# Patient Record
Sex: MALE | Race: BLACK OR AFRICAN AMERICAN | Employment: OTHER | URBAN - METROPOLITAN AREA
[De-identification: names, ages, dates, MRNs, and addresses within clinical notes are randomized per-mention and may not be internally consistent; named-entity substitution may affect disease eponyms.]

---

## 2020-12-18 ENCOUNTER — OFFICE VISIT (OUTPATIENT)
Dept: FAMILY MEDICINE CLINIC | Age: 40
End: 2020-12-18
Payer: COMMERCIAL

## 2020-12-18 VITALS
SYSTOLIC BLOOD PRESSURE: 160 MMHG | OXYGEN SATURATION: 97 % | TEMPERATURE: 96.7 F | DIASTOLIC BLOOD PRESSURE: 90 MMHG | RESPIRATION RATE: 18 BRPM | HEART RATE: 107 BPM | HEIGHT: 76 IN | WEIGHT: 315 LBS | BODY MASS INDEX: 38.36 KG/M2

## 2020-12-18 PROCEDURE — 99213 OFFICE O/P EST LOW 20 MIN: CPT | Performed by: NURSE PRACTITIONER

## 2020-12-18 PROCEDURE — G8484 FLU IMMUNIZE NO ADMIN: HCPCS | Performed by: NURSE PRACTITIONER

## 2020-12-18 PROCEDURE — G8427 DOCREV CUR MEDS BY ELIG CLIN: HCPCS | Performed by: NURSE PRACTITIONER

## 2020-12-18 PROCEDURE — 4004F PT TOBACCO SCREEN RCVD TLK: CPT | Performed by: NURSE PRACTITIONER

## 2020-12-18 PROCEDURE — G8417 CALC BMI ABV UP PARAM F/U: HCPCS | Performed by: NURSE PRACTITIONER

## 2020-12-18 RX ORDER — NAPROXEN 500 MG/1
TABLET ORAL
Qty: 20 TABLET | Refills: 0 | Status: SHIPPED
Start: 2020-12-18 | End: 2021-08-25

## 2020-12-18 RX ORDER — CLINDAMYCIN HYDROCHLORIDE 300 MG/1
300 CAPSULE ORAL 4 TIMES DAILY
Qty: 28 CAPSULE | Refills: 0 | Status: SHIPPED | OUTPATIENT
Start: 2020-12-18 | End: 2020-12-25

## 2020-12-18 NOTE — PROGRESS NOTES
(All laboratory and radiology results have been personally reviewed by myself)  Labs:  No results found for this visit on 12/18/20. Imaging: All Radiology results interpreted by Radiologist unless otherwise noted. No orders to display       Assessment / Plan:   The patient's vitals, allergies, medications, and past medical history have been reviewed. Kiran Nolan was seen today for dental pain. Diagnoses and all orders for this visit:    Infected dental carries  -     clindamycin (CLEOCIN) 300 MG capsule; Take 1 capsule by mouth 4 times daily for 7 days  -     naproxen (NAPROSYN) 500 MG tablet; 1 tablet, 2 times a day as needed for pain        - Disposition: Discharge to home. - Scripts written for Cleocin and Naproxen, side effects discussed. Increase fluids and rest. Pt advised to schedule a f/u appt with either a private dentist or the [facility] dental clinic within 1 week (contact information provided). ED sooner if symptoms worsen or change. ED immediately with severe/worsening pain, dyspnea, dysphagia, trismus, drooling, fever, or facial edema. Pt states understanding and is in agreement with this care plan. All questions answered.       SIGNATURE: Vee Rosales, PERLITA-CNP

## 2021-01-24 ENCOUNTER — HOSPITAL ENCOUNTER (EMERGENCY)
Age: 41
Discharge: HOME | End: 2021-01-24
Attending: EMERGENCY MEDICINE
Payer: COMMERCIAL

## 2021-01-24 VITALS
HEART RATE: 92 BPM | SYSTOLIC BLOOD PRESSURE: 163 MMHG | TEMPERATURE: 97.3 F | BODY MASS INDEX: 38.36 KG/M2 | OXYGEN SATURATION: 95 % | RESPIRATION RATE: 18 BRPM | HEIGHT: 76 IN | DIASTOLIC BLOOD PRESSURE: 88 MMHG | WEIGHT: 315 LBS

## 2021-01-24 DIAGNOSIS — H18.822 CORNEAL ABRASION DUE TO CONTACT LENS, LEFT: Primary | ICD-10-CM

## 2021-01-24 PROCEDURE — 99283 EMERGENCY DEPT VISIT LOW MDM: CPT

## 2021-01-24 PROCEDURE — 6370000000 HC RX 637 (ALT 250 FOR IP)

## 2021-01-24 RX ORDER — ERYTHROMYCIN 5 MG/G
OINTMENT OPHTHALMIC
Qty: 1 TUBE | Refills: 0 | Status: SHIPPED | OUTPATIENT
Start: 2021-01-24 | End: 2021-02-03

## 2021-01-24 RX ORDER — TETRACAINE HYDROCHLORIDE 5 MG/ML
SOLUTION OPHTHALMIC
Status: COMPLETED
Start: 2021-01-24 | End: 2021-01-24

## 2021-01-24 RX ORDER — OXYCODONE HYDROCHLORIDE AND ACETAMINOPHEN 5; 325 MG/1; MG/1
1 TABLET ORAL EVERY 6 HOURS PRN
Qty: 6 TABLET | Refills: 0 | Status: SHIPPED | OUTPATIENT
Start: 2021-01-24 | End: 2021-01-26

## 2021-01-24 RX ORDER — TOBRAMYCIN 3 MG/ML
1 SOLUTION/ DROPS OPHTHALMIC 4 TIMES DAILY
Qty: 1 BOTTLE | Refills: 1 | Status: SHIPPED | OUTPATIENT
Start: 2021-01-24 | End: 2021-02-03

## 2021-01-24 RX ADMIN — FLUORESCEIN SODIUM 1 STRIP: 0.6 STRIP OPHTHALMIC at 10:26

## 2021-01-24 RX ADMIN — TETRACAINE HYDROCHLORIDE: 5 SOLUTION OPHTHALMIC at 10:26

## 2021-01-24 ASSESSMENT — PAIN DESCRIPTION - FREQUENCY: FREQUENCY: CONTINUOUS

## 2021-01-24 ASSESSMENT — PAIN DESCRIPTION - DESCRIPTORS: DESCRIPTORS: SORE

## 2021-01-24 ASSESSMENT — PAIN DESCRIPTION - ORIENTATION: ORIENTATION: LEFT

## 2021-01-24 ASSESSMENT — PAIN DESCRIPTION - LOCATION: LOCATION: EYE

## 2021-01-24 NOTE — ED PROVIDER NOTES
EXAM--------------------------------------      Constitutional/General: Alert and oriented x3, well appearing, non toxic in NAD  Head: NC/AT  Eyes: PERRL, EOMI; left eye is injected 2+. There is no hyphema. There is no foreign body under either the upper or lower lid. Extraocular muscles are intact. Pupils are equal and reactive. Fluorescein staining reveals a dense corneal abrasion just to the left of the center of the left cornea with some more diffuse fluorescein uptake around the small dense abrasion. There is no ulceration at this time. There is no hyphema. Mouth: Oropharynx clear, handling secretions, no trismus  Neck: Supple, full ROM, no meningeal signs  Pulmonary: Lungs clear to auscultation bilaterally, no wheezes, rales, or rhonchi. Not in respiratory distress  Cardiovascular:  Regular rate and rhythm, no murmurs, gallops, or rubs. 2+ distal pulses  Abdomen: Soft, non tender, non distended,   Extremities: Moves all extremities x 4. Warm and well perfused  Skin: warm and dry without rash  Neurologic: GCS 15,  Psych: Normal Affect      ------------------------------ ED COURSE/MEDICAL DECISION MAKING----------------------  Medications   tetracaine (TETRAVISC) 0.5 % ophthalmic solution (has no administration in time range)   fluorescein 0.6 MG ophthalmic strip (has no administration in time range)         Medical Decision Making:   exam    Counseling: The emergency provider has spoken with the patient and discussed todays results, in addition to providing specific details for the plan of care and counseling regarding the diagnosis and prognosis. Questions are answered at this time and they are agreeable with the plan.      --------------------------------- IMPRESSION AND DISPOSITION ---------------------------------    IMPRESSION  1.  Corneal abrasion due to contact lens, left        DISPOSITION  Disposition: Discharge to home  Patient condition is fair                  Darin Paniagua, MD  01/27/21 7229

## 2021-08-25 ENCOUNTER — OFFICE VISIT (OUTPATIENT)
Dept: ENDOCRINOLOGY | Age: 41
End: 2021-08-25
Payer: COMMERCIAL

## 2021-08-25 VITALS
RESPIRATION RATE: 18 BRPM | SYSTOLIC BLOOD PRESSURE: 131 MMHG | BODY MASS INDEX: 38.36 KG/M2 | HEART RATE: 86 BPM | DIASTOLIC BLOOD PRESSURE: 81 MMHG | WEIGHT: 315 LBS | HEIGHT: 76 IN

## 2021-08-25 DIAGNOSIS — E11.9 TYPE 2 DIABETES MELLITUS WITHOUT COMPLICATION, WITH LONG-TERM CURRENT USE OF INSULIN (HCC): Primary | ICD-10-CM

## 2021-08-25 DIAGNOSIS — Z91.119 DIETARY NONCOMPLIANCE: ICD-10-CM

## 2021-08-25 DIAGNOSIS — Z79.4 TYPE 2 DIABETES MELLITUS WITHOUT COMPLICATION, WITH LONG-TERM CURRENT USE OF INSULIN (HCC): Primary | ICD-10-CM

## 2021-08-25 DIAGNOSIS — E55.9 VITAMIN D DEFICIENCY: ICD-10-CM

## 2021-08-25 DIAGNOSIS — E78.5 HYPERLIPIDEMIA, UNSPECIFIED HYPERLIPIDEMIA TYPE: ICD-10-CM

## 2021-08-25 DIAGNOSIS — E66.01 MORBID OBESITY (HCC): ICD-10-CM

## 2021-08-25 LAB — HBA1C MFR BLD: 7.2 %

## 2021-08-25 PROCEDURE — G8427 DOCREV CUR MEDS BY ELIG CLIN: HCPCS | Performed by: INTERNAL MEDICINE

## 2021-08-25 PROCEDURE — 83036 HEMOGLOBIN GLYCOSYLATED A1C: CPT | Performed by: INTERNAL MEDICINE

## 2021-08-25 PROCEDURE — G8417 CALC BMI ABV UP PARAM F/U: HCPCS | Performed by: INTERNAL MEDICINE

## 2021-08-25 PROCEDURE — 2022F DILAT RTA XM EVC RTNOPTHY: CPT | Performed by: INTERNAL MEDICINE

## 2021-08-25 PROCEDURE — 4004F PT TOBACCO SCREEN RCVD TLK: CPT | Performed by: INTERNAL MEDICINE

## 2021-08-25 PROCEDURE — 99204 OFFICE O/P NEW MOD 45 MIN: CPT | Performed by: INTERNAL MEDICINE

## 2021-08-25 PROCEDURE — 3051F HG A1C>EQUAL 7.0%<8.0%: CPT | Performed by: INTERNAL MEDICINE

## 2021-08-25 RX ORDER — AMLODIPINE BESYLATE AND BENAZEPRIL HYDROCHLORIDE 10; 40 MG/1; MG/1
CAPSULE ORAL
COMMUNITY
Start: 2021-07-10

## 2021-08-25 RX ORDER — ROSUVASTATIN CALCIUM 40 MG/1
TABLET, COATED ORAL
COMMUNITY
Start: 2021-07-10 | End: 2022-03-23 | Stop reason: SDUPTHER

## 2021-08-25 RX ORDER — DULAGLUTIDE 1.5 MG/.5ML
INJECTION, SOLUTION SUBCUTANEOUS
COMMUNITY
Start: 2021-08-08 | End: 2022-03-23

## 2021-08-25 RX ORDER — ASPIRIN 81 MG/1
81 TABLET ORAL DAILY
COMMUNITY

## 2021-08-25 RX ORDER — LANCETS 33 GAUGE
EACH MISCELLANEOUS
Qty: 200 EACH | Refills: 6 | Status: SHIPPED
Start: 2021-08-25 | End: 2022-03-23 | Stop reason: SDUPTHER

## 2021-08-25 RX ORDER — BLOOD SUGAR DIAGNOSTIC
STRIP MISCELLANEOUS
Qty: 200 EACH | Refills: 6 | Status: SHIPPED
Start: 2021-08-25 | End: 2022-03-23 | Stop reason: SDUPTHER

## 2021-08-25 RX ORDER — NIACIN 500 MG/1
TABLET, EXTENDED RELEASE ORAL
COMMUNITY
Start: 2021-08-14 | End: 2022-03-23 | Stop reason: SDUPTHER

## 2021-08-25 NOTE — PATIENT INSTRUCTIONS
Recommendations for today's visit  ·   · Check Blood sugar 4 times/day before meals and at bedtime and send us sugar log in a week      These are your blood sugar, blood pressure, cholesterol and A1c goals:  · Blood sugar fastin mg/dl to 130 mg/dl  · Blood sugar before meals: <150 mg/dl  · Peak blood sugar lower than 180 mg/dl  · A1c: between 6.5 - 7.5%    I you have any questions please call Dr. Ron No office     Lucinda Lantigua MD  Endocrinologist, [facility])   [address] [place] [address] [id number]   Phone: [phone]  Fax: [phone]  Email: [email or web address]

## 2021-08-25 NOTE — PROGRESS NOTES
[address] [place] [address] Department of Endocrinology Diabetes and Metabolism   [address] [facility] [address]   Phone: [phone]  Fax: [phone]    Date of Service: 8/25/2021  Primary Care Physician: Soham Maki DO  Referring physician: Billie Frederick MD  Provider: Chema Hoskins MD     Reason for the visit:  DM type 2     History of Present Illness: The history is provided by the patient. No  was used. Accuracy of the patient data is excellent. Yamila Cruz is a very pleasant 42 y.o. male seen today for diabetes management     Yamila Cruz was diagnosed with diabetes at age 35 and currently on invokamet 150/500 mg 1 tab BID, Trulicity 1.5 mg weekly   The patient wasn't checking blood sugar at all   Most recent A1c results summarized below  Lab Results   Component Value Date    LABA1C 7.2 08/25/2021       Patient has had no hypoglycemic episodes   The patient has been mindful of what has been eating and following diabetic diet as encouraged  I reviewed current medications and the patient has no issues with diabetes medications  Yamila Cruz is up to date with eye exam and denied any history of diabetic retinopathy   The patient performs his own feet care  Microvascular complications:  No Retinopathy, Nephropathy or Neuropathy   Macrovascular complications: no CAD, PVD, or Stroke  The patient receives Flushot every year     PAST MEDICAL HISTORY   Past Medical History:   Diagnosis Date    Diabetes mellitus ([address]     Hyperlipidemia     Hypertension        PAST SURGICAL HISTORY   Past Surgical History:   Procedure Laterality Date    ANKLE SURGERY  1996    left ankle. SOCIAL HISTORY   Tobacco:   reports that he has been smoking cigars. He has been smoking about 2.00 packs per day. He has never used smokeless tobacco.  Alcohol:   reports current alcohol use. Drugs:   reports no history of drug use.     FAMILY HISTORY   No family history on file.    ALLERGIES AND DRUG REACTIONS   No Known Allergies    CURRENT MEDICATIONS   Current Outpatient Medications   Medication Sig Dispense Refill    TRULICITY 1.5 ZG/6.2RI SOPN inject 0.5 milliliters ( 1 AND 1/2 milligrams ) subcutaneously ev. ..  (REFER TO PRESCRIPTION NOTES).  amLODIPine-benazepril (LOTREL) 10-40 MG per capsule take 1 capsule by mouth once daily      niacin (NIASPAN) 500 MG extended release tablet take 1 tablet by mouth once daily      rosuvastatin (CRESTOR) 40 MG tablet take 1 tablet by mouth once daily      aspirin 81 MG EC tablet Take 81 mg by mouth daily      blood glucose test strips (ONETOUCH VERIO) strip Check blood sugar 4 times a day before meals and at bedtime. Also check as needed for feelings of hypoglycemia. 200 each 6    Lancets (ONETOUCH DELICA PLUS [id number]) MISC Check blood sugars 4 times daily before meals and at bedtime. Check as needed for feelings of hypoglycemia. 200 each 6    Metoprolol Succinate (TOPROL XL PO) Take by mouth      canagliflozin-metformin HCl (INVOKAMET) 150-500 MG Take 1 tablet by mouth 2 times daily (with meals)      Fenofibrate (TRIGLIDE PO) Take  by mouth. No current facility-administered medications for this visit. Review of Systems  Constitutional: No fever, no chills, no diaphoresis, no generalized weakness. HEENT: No blurred vision, No sore throat, no ear pain, no hair loss  Neck: denied any neck swelling, difficulty swallowing,   Cardio-pulmonary: No CP, SOB or palpitation, No orthopnea or PND. No cough or wheezing. GI: No N/V/D, no constipation, No abdominal pain, no melena or hematochezia   : Denied any dysuria, hematuria, flank pain, discharge, or incontinence. Skin: denied any rash, ulcer, Hirsute, or hyperpigmentation. MSK: denied any joint deformity, joint pain/swelling, muscle pain, or back pain.   Neuro: no numbness, no tingling, no weakness, _    OBJECTIVE    /81   Pulse 86   Resp 18   Ht 6' 4\" (1.93 m)   Wt (!) 365 lb (165.6 kg)   BMI 44.43 kg/m²   BP Readings from Last 4 Encounters:   08/25/21 131/81   01/24/21 (!) 163/88   12/18/20 (!) 160/90   07/12/17 132/80     Wt Readings from Last 6 Encounters:   08/25/21 (!) 365 lb (165.6 kg)   01/24/21 (!) 376 lb (170.6 kg)   12/18/20 (!) 376 lb (170.6 kg)   07/12/17 (!) 378 lb (171.5 kg)       Physical examination:  General: awake alert, oriented x3, no abnormal position or movements. HEENT: normocephalic non-traumatic, no exophthalmos   Neck: supple, no LN enlargement, no thyromegaly, no thyroid tenderness, no JVD. Pulm: Clear equal air entry no added sounds, no wheezing or rhonchi    CVS: S1 + S2, no murmur, no heave. Dorsalis pedis pulse palpable   Abd: soft lax, no tenderness, no organomegaly, audible bowel sounds. Skin: warm, no lesions, no rash. No callus, no Ulcers, No acanthosis nigricans  Musculoskeletal: No back tenderness, no kyphosis/scoliosis    Neuro: CN intact, Monofilament sensation decreased bilateral , muscle power normal  Psych: normal mood, and affect    Review of Laboratory Data:  I personally reviewed the following lab:  No results found for: WBC, RBC, HGB, HCT, MCV, MCH, MCHC, RDW, PLT, MPV, GRANULOCYTES, BANDS   No results found for: NA, K, CO2, BUN, CREATININE, CALCIUM, LABGLOM, GFRAA   No results found for: TSH, T4FREE, K9FLMIE, FT3, J5CSCLZ, TSI, TPOABS, THGAB  Lab Results   Component Value Date    LABA1C 7.2 08/25/2021     Lab Results   Component Value Date    LABA1C 7.2 08/25/2021     No results found for: TRIG, HDL, LDLCALC, CHOL  No results found for: [address], a 42 y.o.-old male seen in for the following issues     Diabetes Mellitus Type 2     · A1c 7.2%   · Continue invokamet 150/500 mg 1 tab BID, Trulicity 1.5 mg weekly   · The patient was advised to check blood sugars 2 times a day before meals and send BS readings to our office in a week.   · Discussed with patient A1c and blood sugar goals   · Patient will need routine diabetes maintenance and prevention  · The patient was referred to diabetic educator for further teaching   · Diabetes labs before next visit     Dietary noncompliance   Discussed with patient the importance of eating consistent carbohydrate meals, avoiding high glycemic index food. Also, discussed with patient the risk and negative consequences of dietary noncompliance on blood glucose control, blood pressure and weight    Obesity   Discussed lifestyle changes including diet and exercise with patient in depth. Also discussed with patient cardiovascular risk associated with obesity   On GLP-1 agonist     HLD  · Continue Crestor 40 mg daily     I personally reviewed external notes from PCP and other patient's care team providers, and personally interpreted labs associated with the above diagnosis. I also ordered labs to further assess and manage the above addressed medical conditions. Return in about 4 months (around 12/25/2021) for DM type 2, VitD deficiency. The above issues were reviewed with the patient who understood and agreed with the plan. Thank you for allowing us to participate in the care of this patient. Please do not hesitate to contact us with any additional questions. Diagnosis Orders   1. Type 2 diabetes mellitus without complication, with long-term current use of insulin ([facility])  POCT glycosylated hemoglobin (Hb A1C)    Lipid Panel    Microalbumin / Creatinine Urine Ratio    Comprehensive Metabolic Panel    blood glucose test strips (ONETOUCH VERIO) strip    Lancets (ONETOUCH DELICA PLUS [id number]) MISC   2. Vitamin D deficiency  Vitamin D 25 Hydroxy   3. Hyperlipidemia, unspecified hyperlipidemia type     4. Dietary noncompliance     5.  Morbid obesity ([facility]       Bart Hurtado MD  Endocrinologist, [facility] Diabetes Care and Endocrinology   [address] [facility] [address]   Phone: [phone]  Fax: [phone]  --------------------------------------------  An electronic signature was used to authenticate this note.  Talisha Hodgson MD on 8/25/2021 at 6:51 PM

## 2022-03-23 ENCOUNTER — OFFICE VISIT (OUTPATIENT)
Dept: ENDOCRINOLOGY | Age: 42
End: 2022-03-23
Payer: COMMERCIAL

## 2022-03-23 VITALS
HEART RATE: 87 BPM | HEIGHT: 76 IN | WEIGHT: 315 LBS | BODY MASS INDEX: 38.36 KG/M2 | DIASTOLIC BLOOD PRESSURE: 84 MMHG | SYSTOLIC BLOOD PRESSURE: 138 MMHG

## 2022-03-23 DIAGNOSIS — E78.5 HYPERLIPIDEMIA, UNSPECIFIED HYPERLIPIDEMIA TYPE: ICD-10-CM

## 2022-03-23 DIAGNOSIS — E11.9 TYPE 2 DIABETES MELLITUS WITHOUT COMPLICATION, WITH LONG-TERM CURRENT USE OF INSULIN (HCC): Primary | ICD-10-CM

## 2022-03-23 DIAGNOSIS — Z79.4 TYPE 2 DIABETES MELLITUS WITHOUT COMPLICATION, WITH LONG-TERM CURRENT USE OF INSULIN (HCC): ICD-10-CM

## 2022-03-23 DIAGNOSIS — Z79.4 TYPE 2 DIABETES MELLITUS WITHOUT COMPLICATION, WITH LONG-TERM CURRENT USE OF INSULIN (HCC): Primary | ICD-10-CM

## 2022-03-23 DIAGNOSIS — E11.9 TYPE 2 DIABETES MELLITUS WITHOUT COMPLICATION, WITH LONG-TERM CURRENT USE OF INSULIN (HCC): ICD-10-CM

## 2022-03-23 LAB
ANION GAP SERPL CALCULATED.3IONS-SCNC: 14 MMOL/L (ref 7–16)
BUN BLDV-MCNC: 9 MG/DL (ref 6–20)
CALCIUM SERPL-MCNC: 9.6 MG/DL (ref 8.6–10.2)
CHLORIDE BLD-SCNC: 101 MMOL/L (ref 98–107)
CHOLESTEROL, TOTAL: 143 MG/DL (ref 0–199)
CO2: 24 MMOL/L (ref 22–29)
CREAT SERPL-MCNC: 0.8 MG/DL (ref 0.7–1.2)
GFR AFRICAN AMERICAN: >60
GFR NON-AFRICAN AMERICAN: >60 ML/MIN/1.73
GLUCOSE BLD-MCNC: 194 MG/DL (ref 74–99)
HBA1C MFR BLD: 7.8 %
HDLC SERPL-MCNC: 56 MG/DL
LDL CHOLESTEROL CALCULATED: 34 MG/DL (ref 0–99)
POTASSIUM SERPL-SCNC: 4.8 MMOL/L (ref 3.5–5)
SODIUM BLD-SCNC: 139 MMOL/L (ref 132–146)
TRIGL SERPL-MCNC: 264 MG/DL (ref 0–149)
VLDLC SERPL CALC-MCNC: 53 MG/DL

## 2022-03-23 PROCEDURE — 2022F DILAT RTA XM EVC RTNOPTHY: CPT | Performed by: INTERNAL MEDICINE

## 2022-03-23 PROCEDURE — G8417 CALC BMI ABV UP PARAM F/U: HCPCS | Performed by: INTERNAL MEDICINE

## 2022-03-23 PROCEDURE — 3051F HG A1C>EQUAL 7.0%<8.0%: CPT | Performed by: INTERNAL MEDICINE

## 2022-03-23 PROCEDURE — 83036 HEMOGLOBIN GLYCOSYLATED A1C: CPT | Performed by: INTERNAL MEDICINE

## 2022-03-23 PROCEDURE — G8484 FLU IMMUNIZE NO ADMIN: HCPCS | Performed by: INTERNAL MEDICINE

## 2022-03-23 PROCEDURE — G8428 CUR MEDS NOT DOCUMENT: HCPCS | Performed by: INTERNAL MEDICINE

## 2022-03-23 PROCEDURE — 99214 OFFICE O/P EST MOD 30 MIN: CPT | Performed by: INTERNAL MEDICINE

## 2022-03-23 PROCEDURE — 4004F PT TOBACCO SCREEN RCVD TLK: CPT | Performed by: INTERNAL MEDICINE

## 2022-03-23 RX ORDER — NIACIN 500 MG/1
TABLET, EXTENDED RELEASE ORAL
Qty: 90 TABLET | Refills: 2 | Status: SHIPPED | OUTPATIENT
Start: 2022-03-23

## 2022-03-23 RX ORDER — ROSUVASTATIN CALCIUM 40 MG/1
TABLET, COATED ORAL
Qty: 90 TABLET | Refills: 2 | Status: SHIPPED | OUTPATIENT
Start: 2022-03-23

## 2022-03-23 RX ORDER — DULAGLUTIDE 3 MG/.5ML
3 INJECTION, SOLUTION SUBCUTANEOUS WEEKLY
Qty: 12 PEN | Refills: 2 | Status: SHIPPED | OUTPATIENT
Start: 2022-03-23

## 2022-03-23 RX ORDER — CANAGLIFLOZIN AND METFORMIN HYDROCHLORIDE 150; 500 MG/1; MG/1
1 TABLET, FILM COATED ORAL 2 TIMES DAILY WITH MEALS
Qty: 180 TABLET | Refills: 2 | Status: SHIPPED | OUTPATIENT
Start: 2022-03-23

## 2022-03-23 RX ORDER — LANCETS 33 GAUGE
EACH MISCELLANEOUS
Qty: 100 EACH | Refills: 6 | Status: SHIPPED | OUTPATIENT
Start: 2022-03-23

## 2022-03-23 RX ORDER — BLOOD SUGAR DIAGNOSTIC
STRIP MISCELLANEOUS
Qty: 100 EACH | Refills: 6 | Status: SHIPPED | OUTPATIENT
Start: 2022-03-23

## 2022-03-23 NOTE — PROGRESS NOTES
[address] [place] [address] Department of Endocrinology Diabetes and Metabolism   [address] [facility] [address]   Phone: [phone]  Fax: [phone]    Date of Service: 3/23/2022  Primary Care Physician: [address]  Referring physician: No ref. provider found  Provider: Sosa Cabezas MD     Reason for the visit:  DM type 2     History of Present Illness: The history is provided by the patient. No  was used. Accuracy of the patient data is excellent. Paradise Banks is a very pleasant 42 y.o. male seen today for diabetes management     Paradise Banks was diagnosed with diabetes at age 35 and currently on invokamet 150/500 mg 1 tab BID, Trulicity 3mg weekly   The pateint ran out of test strips 3 weeks ago and wasn't wasn't checking BG   Most recent A1c results summarized below  Lab Results   Component Value Date    LABA1C 7.8 03/23/2022    LABA1C 7.2 08/25/2021       Patient has had no hypoglycemic episodes   The patient has been mindful of what has been eating and following diabetic diet as encouraged  I reviewed current medications and the patient has no issues with diabetes medications  Paradise Banks is up to date with eye exam and denied any history of diabetic retinopathy   The patient performs his own feet care  Microvascular complications:  No Retinopathy, Nephropathy or Neuropathy   Macrovascular complications: no CAD, PVD, or Stroke  The patient receives Flushot every year     PAST MEDICAL HISTORY   Past Medical History:   Diagnosis Date    Diabetes mellitus ([address]     Hyperlipidemia     Hypertension        PAST SURGICAL HISTORY   Past Surgical History:   Procedure Laterality Date    ANKLE SURGERY  1996    left ankle. SOCIAL HISTORY   Tobacco:   reports that he has quit smoking. His smoking use included cigars. He smoked 2.00 packs per day. He uses smokeless tobacco.  Alcohol:   reports current alcohol use.   Drugs:   reports no history of drug use. FAMILY HISTORY   No family history on file. ALLERGIES AND DRUG REACTIONS   No Known Allergies    CURRENT MEDICATIONS   Current Outpatient Medications   Medication Sig Dispense Refill    TRULICITY 1.5 QV/1.1XF SOPN inject 0.5 milliliters ( 1 AND 1/2 milligrams ) subcutaneously ev. ..  (REFER TO PRESCRIPTION NOTES).  amLODIPine-benazepril (LOTREL) 10-40 MG per capsule take 1 capsule by mouth once daily      niacin (NIASPAN) 500 MG extended release tablet take 1 tablet by mouth once daily      rosuvastatin (CRESTOR) 40 MG tablet take 1 tablet by mouth once daily      aspirin 81 MG EC tablet Take 81 mg by mouth daily      blood glucose test strips (ONETOUCH VERIO) strip Check blood sugar 4 times a day before meals and at bedtime. Also check as needed for feelings of hypoglycemia. 200 each 6    Lancets (ONETOUCH DELICA PLUS [id number]) MISC Check blood sugars 4 times daily before meals and at bedtime. Check as needed for feelings of hypoglycemia. 200 each 6    Metoprolol Succinate (TOPROL XL PO) Take by mouth      canagliflozin-metformin HCl (INVOKAMET) 150-500 MG Take 1 tablet by mouth 2 times daily (with meals)      Fenofibrate (TRIGLIDE PO) Take  by mouth. No current facility-administered medications for this visit. Review of Systems  Constitutional: No fever, no chills, no diaphoresis, no generalized weakness. HEENT: No blurred vision, No sore throat, no ear pain, no hair loss  Neck: denied any neck swelling, difficulty swallowing,   Cardio-pulmonary: No CP, SOB or palpitation, No orthopnea or PND. No cough or wheezing. GI: No N/V/D, no constipation, No abdominal pain, no melena or hematochezia   : Denied any dysuria, hematuria, flank pain, discharge, or incontinence. Skin: denied any rash, ulcer, Hirsute, or hyperpigmentation. MSK: denied any joint deformity, joint pain/swelling, muscle pain, or back pain.   Neuro: no numbness, no tingling, no weakness, _    OBJECTIVE    /84   Pulse 87   Ht 6' 4\" (1.93 m)   Wt (!) 373 lb (169.2 kg)   BMI 45.40 kg/m²   BP Readings from Last 4 Encounters:   03/23/22 138/84   08/25/21 131/81   01/24/21 (!) 163/88   12/18/20 (!) 160/90     Wt Readings from Last 6 Encounters:   03/23/22 (!) 373 lb (169.2 kg)   08/25/21 (!) 365 lb (165.6 kg)   01/24/21 (!) 376 lb (170.6 kg)   12/18/20 (!) 376 lb (170.6 kg)   07/12/17 (!) 378 lb (171.5 kg)       Physical examination:  General: awake alert, oriented x3, no abnormal position or movements. HEENT: normocephalic non-traumatic, no exophthalmos   Neck: supple, no LN enlargement, no thyromegaly, no thyroid tenderness, no JVD. Pulm: Clear equal air entry no added sounds, no wheezing or rhonchi    CVS: S1 + S2, no murmur, no heave. Dorsalis pedis pulse palpable   Abd: soft lax, no tenderness, no organomegaly, audible bowel sounds. Skin: warm, no lesions, no rash.  No callus, no Ulcers, No acanthosis nigricans  Musculoskeletal: No back tenderness, no kyphosis/scoliosis    Neuro: CN intact, Monofilament sensation decreased bilateral , muscle power normal  Psych: normal mood, and affect    Review of Laboratory Data:  I personally reviewed the following lab:  No results found for: WBC, RBC, HGB, HCT, MCV, MCH, MCHC, RDW, PLT, MPV, GRANULOCYTES, PRIMO, BANDS   No results found for: NA, K, CHLORIDE, CO2, BUN, CREATININE, CALCIUM, LABGLOM, GFRAA   No results found for: TSH, T4FREE, A2ARHYK, FT3, I3AOTRD, TSI, TPOABS, THGAB  Lab Results   Component Value Date    LABA1C 7.8 03/23/2022     Lab Results   Component Value Date    LABA1C 7.8 03/23/2022    LABA1C 7.2 08/25/2021     No results found for: TRIG, HDL, LDLCALC, CHOL  No results found for: [address], a 42 y.o.-old male seen in for the following issues     Diabetes Mellitus Type 2     · 1c 7.8%   · Continue invokamet 150/500 mg 1 tab BID, Trulicity 3 mg weekly   · Discussed the risk of Pancreatitis with GLP-1 agonist and high TG level   · Will check Triglyceride level today (fasting >12hrs)   · The patient was advised to check blood sugars 2 times a day before meals and send BS readings to our office in a week. · Discussed with patient A1c and blood sugar goals   · Patient will need routine diabetes maintenance and prevention  · Diabetes labs before next visit     Dietary noncompliance   Discussed with patient the importance of eating consistent carbohydrate meals, avoiding high glycemic index food. Also, discussed with patient the risk and negative consequences of dietary noncompliance on blood glucose control, blood pressure and weight    Obesity   Discussed lifestyle changes including diet and exercise with patient in depth. Also discussed with patient cardiovascular risk associated with obesity   On GLP-1 agonist     HLD  · Continue Crestor 40 mg daily     I personally reviewed external notes from PCP and other patient's care team providers, and personally interpreted labs associated with the above diagnosis. I also ordered labs to further assess and manage the above addressed medical conditions. Return in about 4 months (around 7/23/2022) for DM type 2, VitD deficiency. The above issues were reviewed with the patient who understood and agreed with the plan. Thank you for allowing us to participate in the care of this patient. Please do not hesitate to contact us with any additional questions. Diagnosis Orders   1. Type 2 diabetes mellitus without complication, with long-term current use of insulin ([facility])  POCT glycosylated hemoglobin (Hb A1C)    blood glucose test strips (ONETOUCH VERIO) strip    Lancets (ONETOUCH DELICA PLUS [id number]) MISC    niacin (NIASPAN) 500 MG extended release tablet    Dulaglutide (TRULICITY) 3 IF/3.2XA SOPN    canagliflozin-metFORMIN HCl (INVOKAMET) 150-500 MG    rosuvastatin (CRESTOR) 40 MG tablet    Lipid Panel    Basic Metabolic Panel   2.  Hyperlipidemia, unspecified hyperlipidemia type  niacin (NIASPAN) 500 MG extended release tablet    rosuvastatin (CRESTOR) 40 MG tablet    Lipid Panel    Basic Metabolic Panel     Sudhakar Stanley MD  Endocrinologist, [place] [facility] Diabetes Care and Endocrinology   [address] [place] [address]   Phone: [phone]  Fax: [phone]  --------------------------------------------  An electronic signature was used to authenticate this note.  Charles Elkins MD on 3/23/2022 at 11:28 AM

## 2022-03-27 ENCOUNTER — TELEPHONE (OUTPATIENT)
Dept: ENDOCRINOLOGY | Age: 42
End: 2022-03-27

## 2022-03-27 NOTE — TELEPHONE ENCOUNTER
Notify pt,  I have reviewed your recent results    Triglyceride was elevated but not markedly elevated. Ok to take Trulicity 3 mg/wk.  Controlling DM will likely help Triglyceride level     Please send us sugar log in a wk or two to help adjusting DM regimen

## 2022-06-08 ENCOUNTER — HOSPITAL ENCOUNTER (EMERGENCY)
Age: 42
Discharge: HOME | End: 2022-06-08
Attending: STUDENT IN AN ORGANIZED HEALTH CARE EDUCATION/TRAINING PROGRAM
Payer: COMMERCIAL

## 2022-06-08 VITALS
HEIGHT: 76 IN | TEMPERATURE: 97.7 F | BODY MASS INDEX: 36.53 KG/M2 | OXYGEN SATURATION: 94 % | WEIGHT: 300 LBS | SYSTOLIC BLOOD PRESSURE: 160 MMHG | RESPIRATION RATE: 16 BRPM | DIASTOLIC BLOOD PRESSURE: 90 MMHG | HEART RATE: 106 BPM

## 2022-06-08 DIAGNOSIS — Z20.822 EXPOSURE TO COVID-19 VIRUS: Primary | ICD-10-CM

## 2022-06-08 LAB — SARS-COV-2, NAAT: NOT DETECTED

## 2022-06-08 PROCEDURE — 87635 SARS-COV-2 COVID-19 AMP PRB: CPT

## 2022-06-08 PROCEDURE — 99283 EMERGENCY DEPT VISIT LOW MDM: CPT

## 2022-06-08 ASSESSMENT — PAIN - FUNCTIONAL ASSESSMENT
PAIN_FUNCTIONAL_ASSESSMENT: NONE - DENIES PAIN
PAIN_FUNCTIONAL_ASSESSMENT: NONE - DENIES PAIN

## 2022-06-08 NOTE — ED PROVIDER NOTES
Department of Emergency Medicine   ED  Provider Note  Admit Date/RoomTime: 6/8/2022  8:05 AM  ED Room: 04/04          History of Present Illness:  6/8/22, Time: 8:25 AM EDT  Chief Complaint   Patient presents with    Concern For COVID-19     exposed to neice who tested pos, took 2 at-home tests and one was pos and the other neg. wants tested here         Shree James is a 42 y.o. male presenting to the ED for concern for COVID-19. The patient states his niece tested positive for COVID yesterday. He then took 3 home COVID test for himself. 2 of them were negative and was positive. He is unsure whether or not he could have COVID or not and is concerned. He comes in for further evaluation. He reports that he has no symptoms at all and feels that his normal baseline state of health. He does endorse a history of hypertension as well. Review of Systems:  Review of systems obtained and negative unless stated otherwise above in the HPI.    --------------------------------------------- PAST HISTORY ---------------------------------------------  Past Medical History:  has a past medical history of Diabetes mellitus ([address] Hyperlipidemia, and Hypertension. Past Surgical History:  has a past surgical history that includes Ankle surgery (1996). Social History:  reports that he has quit smoking. His smoking use included cigars. He smoked 2.00 packs per day. He uses smokeless tobacco. He reports current alcohol use. He reports that he does not use drugs. Family History: family history is not on file. . Unless otherwise noted, family history is non contributory    The patients home medications have been reviewed. Allergies: Patient has no known allergies.     I have reviewed the past medical history, past surgical history, social history, and family history    ---------------------------------------------------PHYSICAL EXAM--------------------------------------    Constitutional: [Appears in no distress]  Head: [Normocephalic]   Eyes: [No conjunctial injection]  ENT: [Moist mucous membranes]  Neck: [Trachea midline]  Respiratory: [Nonlabored respirations, no tachypnea]  Cardiovascular:  [Brisk capillary refill]   Gastrointestinal:  [Nonsistendended abdomen.]   Musculoskeletal: [Moves all extremities]  Skin: [No diaphoresis on visualized skin]   Neurologic: [Alert, symmetric facies, no aphasia]  Psychiatric: [Acting appropriately]    -------------------------------------------------- RESULTS -------------------------------------------------  I have personally reviewed all laboratory and imaging results for this patient. Results are listed below. LABS: (Lab results interpreted by me)  Results for orders placed or performed during the hospital encounter of 06/08/22   COVID-19, Rapid    Specimen: Nasopharyngeal Swab   Result Value Ref Range    SARS-CoV-2, NAAT Not Detected Not Detected   ,       RADIOLOGY:  Interpreted by Radiologist unless otherwise specified  No orders to display         ------------------------- NURSING NOTES AND VITALS REVIEWED ---------------------------   The nursing notes within the ED encounter and vital signs as below have been reviewed by myself  BP (!) 169/115   Pulse (!) 106   Temp 97.7 °F (36.5 °C)   Resp 16   Ht 6' 4\" (1.93 m)   Wt 300 lb (136.1 kg)   SpO2 94%   BMI 36.52 kg/m²     Oxygen Saturation Interpretation: [Normal]    The patients available past medical records and past encounters were reviewed. ------------------------------ ED COURSE/MEDICAL DECISION MAKING----------------------  Medications - No data to display     Re-Evaluations: This patient's ED course included: [a personal history and physicial examination and re-evaluation prior to disposition]    This patient has [remained hemodynamically stable] during their ED course.       Consultations:  [None]    Medical Decision Making:   Patient presents emerged department with concern for COVID-19 exposure. Oxygen is improved on repeat evaluation is 97% on room air. He is given a copy of his COVID wash result and discharged with outpatient follow-up. Counseling: The emergency provider has spoken with the patient and discussed todays results, in addition to providing specific details for the plan of care and counseling regarding the diagnosis and prognosis. Questions are answered at this time and they are agreeable with the plan.       --------------------------------- IMPRESSION AND DISPOSITION ---------------------------------    IMPRESSION  1. Exposure to COVID-19 virus        DISPOSITION  Disposition: [Discharge to home]  Patient condition is [stable]    IDr. Sully, am the primary provider of record    Sully Box DO  Emergency Medicine    NOTE: This report was transcribed using voice recognition software.  Every effort was made to ensure accuracy; however, inadvertent computerized transcription errors may be present         Franki Wrgiht DO  06/08/22 [id number]

## 2022-11-10 NOTE — PATIENT INSTRUCTIONS
Patient Education        Abscessed Tooth: Care Instructions  Your Care Instructions     An abscessed tooth is a tooth that has a pocket of pus in the tissues around it. Pus forms when the body tries to fight an infection caused by bacteria. If the pus cannot drain, it forms an abscess. An abscessed tooth can cause red, swollen gums and throbbing pain, especially when you chew. You may have a bad taste in your mouth and a fever, and your jaw may swell. Damage to the tooth, untreated tooth decay, or gum disease can cause an abscessed tooth. An abscessed tooth needs to be treated by a dental professional right away. If it is not treated, the infection could spread to other parts of your body. Your dentist will give you antibiotics to stop the infection. He or she may make a hole in the tooth or cut open (moises) the abscess inside your mouth so that the infection can drain, which should relieve your pain. You may need to have a root canal treatment, which tries to save your tooth by taking out the infected pulp and replacing it with a healing medicine and/or a filling. If these treatments do not work, your tooth may have to be removed. Follow-up care is a key part of your treatment and safety. Be sure to make and go to all appointments, and call your doctor if you are having problems. It's also a good idea to know your test results and keep a list of the medicines you take. How can you care for yourself at home? · Reduce pain and swelling in your face and jaw by putting ice or a cold pack on the outside of your cheek. Do this for 10 to 20 minutes at a time. Put a thin cloth between the ice and your skin. · Take pain medicines exactly as directed. ? If the doctor gave you a prescription medicine for pain, take it as prescribed. ? If you are not taking a prescription pain medicine, ask your doctor if you can take an over-the-counter medicine. · Take antibiotics as directed. Do not stop taking them just because you feel better. You need to take the full course of antibiotics. To prevent tooth abscess  · Brush and floss every day. Have regular dental checkups. · Eat a healthy diet. Avoid sugary foods and drinks. · Do not smoke or vape with nicotine. And don't use spit tobacco. Tobacco and nicotine slow your ability to heal. They increase your risk for gum disease and cancer of the mouth and throat. If you need help quitting, talk to your doctor about stop-smoking programs and medicines. These can increase your chances of quitting for good. When should you call for help? Call 911 anytime you think you may need emergency care. For example, call if:    · You have trouble breathing. Call your doctor now or seek immediate medical care if:    · You have new or worse symptoms of infection, such as:  ? Increased pain, swelling, warmth, or redness. ? Red streaks leading from the area. ? Pus draining from the area. ? A fever. Watch closely for changes in your health, and be sure to contact your doctor if:    · You do not get better as expected. Where can you learn more? Go to https://[employer].Edustation.me. org and sign in to your SMART account. Enter P931 in the Dealflicks box to learn more about \"Abscessed Tooth: Care Instructions. \"     If you do not have an account, please click on the \"Sign Up Now\" link. Current as of: March 25, 2020               Content Version: 12.6  © 2006-2020 [employer]. Care instructions adapted under license by [facility]. If you have questions about a medical condition or this instruction, always ask your healthcare professional. [place] [address] any warranty or liability for your use of this information. Detail Level: Zone Detail Level: Generalized Detail Level: Detailed

## 2023-05-16 ENCOUNTER — PREP FOR PROCEDURE (OUTPATIENT)
Dept: SURGERY | Age: 43
End: 2023-05-16

## 2023-05-16 RX ORDER — SODIUM CHLORIDE 9 MG/ML
INJECTION, SOLUTION INTRAVENOUS CONTINUOUS
Status: CANCELLED | OUTPATIENT
Start: 2023-05-16

## 2023-06-15 DIAGNOSIS — E11.9 TYPE 2 DIABETES MELLITUS WITHOUT COMPLICATION, WITH LONG-TERM CURRENT USE OF INSULIN (HCC): ICD-10-CM

## 2023-06-15 DIAGNOSIS — Z79.4 TYPE 2 DIABETES MELLITUS WITHOUT COMPLICATION, WITH LONG-TERM CURRENT USE OF INSULIN (HCC): ICD-10-CM

## 2023-06-19 RX ORDER — CANAGLIFLOZIN AND METFORMIN HYDROCHLORIDE 150; 500 MG/1; MG/1
1 TABLET, FILM COATED ORAL 2 TIMES DAILY WITH MEALS
Qty: 180 TABLET | Refills: 2 | OUTPATIENT
Start: 2023-06-19